# Patient Record
Sex: FEMALE | Race: BLACK OR AFRICAN AMERICAN
[De-identification: names, ages, dates, MRNs, and addresses within clinical notes are randomized per-mention and may not be internally consistent; named-entity substitution may affect disease eponyms.]

---

## 2018-10-22 ENCOUNTER — HOSPITAL ENCOUNTER (EMERGENCY)
Dept: HOSPITAL 63 - ER | Age: 33
Discharge: HOME | End: 2018-10-22
Payer: OTHER GOVERNMENT

## 2018-10-22 VITALS — DIASTOLIC BLOOD PRESSURE: 106 MMHG | SYSTOLIC BLOOD PRESSURE: 150 MMHG

## 2018-10-22 VITALS — BODY MASS INDEX: 27.47 KG/M2 | WEIGHT: 175 LBS | HEIGHT: 67 IN
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
## 2018-10-22 DIAGNOSIS — H53.8: ICD-10-CM
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
## 2018-10-22 DIAGNOSIS — S09.90XA: Primary | ICD-10-CM

## 2018-10-22 PROCEDURE — 70450 CT HEAD/BRAIN W/O DYE: CPT
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
